# Patient Record
Sex: MALE | Race: BLACK OR AFRICAN AMERICAN | Employment: FULL TIME | URBAN - METROPOLITAN AREA
[De-identification: names, ages, dates, MRNs, and addresses within clinical notes are randomized per-mention and may not be internally consistent; named-entity substitution may affect disease eponyms.]

---

## 2017-02-10 ENCOUNTER — HOSPITAL ENCOUNTER (EMERGENCY)
Age: 32
Discharge: HOME | End: 2017-02-10
Attending: EMERGENCY MEDICINE | Admitting: EMERGENCY MEDICINE
Payer: SELF-PAY

## 2017-02-10 VITALS
BODY MASS INDEX: 22.5 KG/M2 | SYSTOLIC BLOOD PRESSURE: 140 MMHG | HEART RATE: 80 BPM | HEIGHT: 66 IN | OXYGEN SATURATION: 100 % | RESPIRATION RATE: 16 BRPM | DIASTOLIC BLOOD PRESSURE: 92 MMHG | WEIGHT: 140 LBS | TEMPERATURE: 99.4 F

## 2017-02-10 DIAGNOSIS — K02.9 PAIN DUE TO DENTAL CARIES: Primary | ICD-10-CM

## 2017-02-10 DIAGNOSIS — K05.10 GINGIVITIS: ICD-10-CM

## 2017-02-10 PROCEDURE — 99283 EMERGENCY DEPT VISIT LOW MDM: CPT

## 2017-02-10 PROCEDURE — 74011250637 HC RX REV CODE- 250/637: Performed by: EMERGENCY MEDICINE

## 2017-02-10 RX ORDER — OXYCODONE AND ACETAMINOPHEN 5; 325 MG/1; MG/1
1 TABLET ORAL
Qty: 20 TAB | Refills: 0 | Status: SHIPPED | OUTPATIENT
Start: 2017-02-10 | End: 2017-02-17

## 2017-02-10 RX ORDER — OXYCODONE AND ACETAMINOPHEN 5; 325 MG/1; MG/1
1 TABLET ORAL
Status: COMPLETED | OUTPATIENT
Start: 2017-02-10 | End: 2017-02-10

## 2017-02-10 RX ORDER — CHLORHEXIDINE GLUCONATE 1.2 MG/ML
15 RINSE ORAL 2 TIMES DAILY
Qty: 420 ML | Refills: 0 | Status: SHIPPED | OUTPATIENT
Start: 2017-02-10 | End: 2017-02-24

## 2017-02-10 RX ORDER — PENICILLIN V POTASSIUM 500 MG/1
500 TABLET, FILM COATED ORAL 4 TIMES DAILY
Qty: 40 TAB | Refills: 0 | Status: SHIPPED | OUTPATIENT
Start: 2017-02-10 | End: 2017-02-20

## 2017-02-10 RX ADMIN — OXYCODONE HYDROCHLORIDE AND ACETAMINOPHEN 1 TABLET: 5; 325 TABLET ORAL at 14:58

## 2017-02-10 NOTE — ED NOTES
I have reviewed discharge instructions with the patient. The patient verbalized understanding. Patient armband removed and given to patient to take home. Patient was informed of the privacy risks if armband lost or stolen. Pt alert, oriented x4 and ambulatory out of ER in [facility] at this time. VSS.

## 2017-02-10 NOTE — ED PROVIDER NOTES
HPI Comments: 2:52 PM Danyelle Disla is a 36 y.o. male who presents to the ED c/o L upper gum pain. Pt states that the pain started 2 days ago and he has associated swelling. Pt denies temperature sensitivity. Pt has no other sx or complaints. History reviewed. No pertinent past medical history. History reviewed. No pertinent past surgical history. History reviewed. No pertinent family history. Social History     Social History    Marital status: SINGLE     Spouse name: N/A    Number of children: N/A    Years of education: N/A     Occupational History    Not on file. Social History Main Topics    Smoking status: Never Smoker    Smokeless tobacco: Not on file    Alcohol use No    Drug use: No    Sexual activity: Not on file     Other Topics Concern    Not on file     Social History Narrative    No narrative on file         ALLERGIES: Review of patient's allergies indicates no known allergies. Review of Systems   Constitutional: Negative for chills and fever. HENT: Positive for dental problem. Negative for sore throat. Respiratory: Negative for shortness of breath. Cardiovascular: Negative for chest pain. Gastrointestinal: Negative for diarrhea and vomiting. Skin: Negative for rash. Neurological: Negative for headaches. All other systems reviewed and are negative. Vitals:    02/10/17 1325   BP: (!) 140/92   Pulse: 80   Resp: 16   Temp: 99.4 °F (37.4 °C)   SpO2: 100%   Weight: 63.5 kg (140 lb)   Height: 5' 6\" (1.676 m)            Physical Exam   Constitutional: He is oriented to person, place, and time. He appears well-developed and well-nourished. He appears distressed. 36year old  male in moderate painful distress. HENT:   Head: Normocephalic and atraumatic. Right Ear: External ear normal.   Left Ear: External ear normal.   Nose: Nose normal.   Mouth/Throat: Oropharynx is clear and moist. No oropharyngeal exudate.    Severe dental decay tooth #9 and tooth #10. Moderate gingival swelling and erythema noted, no bloating   Eyes: Conjunctivae and EOM are normal. Pupils are equal, round, and reactive to light. Right eye exhibits no discharge. Left eye exhibits no discharge. No scleral icterus. Neck: Normal range of motion. Neck supple. No JVD present. No tracheal deviation present. No thyromegaly present. Cardiovascular: Normal rate, regular rhythm, normal heart sounds and intact distal pulses. Exam reveals no gallop and no friction rub. No murmur heard. Pulmonary/Chest: Effort normal and breath sounds normal. No stridor. No respiratory distress. He has no wheezes. He has no rales. He exhibits no tenderness. Abdominal: Soft. Bowel sounds are normal. He exhibits no distension and no mass. There is no tenderness. There is no rebound and no guarding. Musculoskeletal: Normal range of motion. He exhibits no edema, tenderness or deformity. Lymphadenopathy:     He has no cervical adenopathy. Neurological: He is alert and oriented to person, place, and time. No cranial nerve deficit. He exhibits normal muscle tone. Coordination normal.   Skin: Skin is warm and dry. No rash noted. He is not diaphoretic. No erythema. No pallor. Psychiatric: He has a normal mood and affect. His behavior is normal. Judgment and thought content normal.   Nursing note and vitals reviewed. MDM  Number of Diagnoses or Management Options  Gingivitis:   Pain due to dental caries:   Diagnosis management comments: Differential includes:  Gingivitis, Dental abscess, Dental caries.        Amount and/or Complexity of Data Reviewed  Review and summarize past medical records: yes    Risk of Complications, Morbidity, and/or Mortality  Presenting problems: low  Diagnostic procedures: minimal  Management options: low    Patient Progress  Patient progress: stable    ED Course       Procedures        Vitals:  Patient Vitals for the past 12 hrs:   Temp Pulse Resp BP SpO2   02/10/17 1325 99.4 °F (37.4 °C) 80 16 (!) 140/92 100 %       Medications ordered:   Medications   oxyCODONE-acetaminophen (PERCOCET) 5-325 mg per tablet 1 Tab (not administered)         Lab findings:  No results found for this or any previous visit (from the past 12 hour(s)). Reevaluation of patient:   2:53 PM I have reassessed the patient and discussed their results and diagnosis. Pt will be discharged in stable condition. Patient is to return to emergency department if any new or worsening condition. Patient understands and verbalizes agreement with plan. Disposition:  Diagnosis:   1. Pain due to dental caries    2. Gingivitis        Disposition: Discharge     Follow-up Information     Follow up With Details Comments [address] [address] [phone]           Patient's Medications   Start Taking    CHLORHEXIDINE (PERIDEX) 0.12 % SOLUTION    15 mL by Swish and Spit route two (2) times a day for 14 days. OXYCODONE-ACETAMINOPHEN (PERCOCET) 5-325 MG PER TABLET    Take 1 Tab by mouth every six (6) hours as needed for Pain (1 to 2 tablets) for up to 7 days. Max Daily Amount: 4 Tabs. PENICILLIN V POTASSIUM (VEETID) 500 MG TABLET    Take 1 Tab by mouth four (4) times daily for 10 days. Continue Taking    No medications on file   These Medications have changed    No medications on file   Stop Taking    No medications on file       Scribe Attestation:   I, Nanci Luke, am scribing for and in the presence of Keo Corrigan DO on this day 02/10/17 at 2:53 PM   cathy Verma    Provider Attestation:  I personally performed the services described in the documentation, reviewed the documentation, as recorded by the scribe in my presence, and it accurately and completely records my words and actions.   Keo Corrigan DO. 2:53 PM      Signed by: Cathy Verma, 2:53 PM

## 2017-02-10 NOTE — DISCHARGE INSTRUCTIONS
Learning About Dental Care for Your Child  What is good dental care for your child? It's never too early to start cleaning your child's gums and teeth. Bacteria, like those found in plaque, can lead to dental problems. Plaque is a thin film of bacteria that sticks to teeth above and below the gum line. The bacteria in plaque use sugars in food to make acids. These acids can cause tooth decay and gum disease. Good brushing habits can help to remove bacteria and prevent plaque. And regular teeth cleaning by your child's dentist can remove tartar, which is plaque that has built up and hardened. As part of your child's dental health, give your child healthy foods, including whole grains, vegetables, and fruits. Try to avoid foods that are high in sugar and processed carbohydrates, such as pastries, pasta, and white bread. Healthy eating helps to keep gums healthy and make teeth strong. It also helps your child avoid tooth decay, which can lead to holes (cavities) in the teeth. How can you manage your child's dental care? Birth to 3 years  · Make sure that your family practices good dental habits. Keeping your own teeth and gums healthy lowers the risk of passing bacteria from your mouth to your child. Also, avoid sharing spoons and other utensils with your child. · Don't put your baby to bed with a bottle of juice, milk, formula, or other sugary liquid. This raises the chance of tooth decay. · Use a soft cloth to clean your baby's gums. Start a few days after birth, and do this until the first teeth come in. As soon as the teeth come in, clean them with a soft toothbrush. Ask your dentist if it's okay to use a rice-sized amount of fluoride toothpaste. · Experts recommend that your child have a dental exam by his or her first birthday or 6 months after the first teeth appear, whichever comes first.  Ages 3 to 10 years  · Your child can learn how to brush his or her own teeth at about 1years of age. Children should be brushing their own teeth, morning and night, by age 3. You should still supervise and check for proper cleaning. · Give your child a small, soft toothbrush. Use a pea-sized amount of fluoride toothpaste. Encourage your child to watch you and older siblings brush teeth. Teach your child not to swallow the toothpaste. · Talk with your dentist about when and how to floss your child's teeth and to teach your child to floss. · Help children age 3 years and older to stop sucking their fingers, thumbs, or pacifiers. If your child can't stop, see your dentist. [facility] children[facility] dentist is specially trained to treat this problem. Ages 10 to 16 years  · A child's teeth should be flossed as soon as the teeth touch each other. Flossing can be hard for a child to learn. Talk with your dentist about the right way to teach your child how to floss. · Your dentist may advise the use of a mouthwash that contains fluoride. But teach your child not to swallow it. · Use disclosing tablets from time to time. They can help you see if any plaque is left on your child's teeth after brushing. These tablets are chewable and will color any plaque left on the teeth after the child brushes. You can buy these at most drugstores. · After your child's permanent teeth begin to appear, talk with your dentist about having dental sealant placed on the molars. Follow-up care is a key part of your child's treatment and safety. Be sure to make and go to all appointments, and call your dentist if your child is having problems. It's also a good idea to know your test results and keep a list of the medicines your child takes. Where can you learn more? Go to http://magdi-zaria.info/. Enter A467 in the search box to learn more about \"Learning About Dental Care for Your Child. \"  Current as of: August 9, 2016  Content Version: 11.1  © 9616-0783 [employer].  Care instructions adapted under license by [employer] (which disclaims liability or warranty for this information). If you have questions about a medical condition or this instruction, always ask your healthcare professional. [address] any warranty or liability for your use of this information. Tooth Decay: Care Instructions  Your Care Instructions    Tooth decay is damage to a tooth caused by plaque. Plaque is a thin film of bacteria that sticks to the teeth above and below the gum line. If plaque isn't removed from the teeth, it can build up and harden into tartar. The bacteria in plaque and tartar use sugars in food to make acids. These acids can cause tooth decay and gum disease. Any part of your tooth can decay, from the roots below the gum line to the chewing surface. Decay can affect the outer layer (enamel) or inner layer (dentin) of your teeth. The deeper the decay, the worse the damage. Untreated tooth decay will get worse and may lead to tooth loss. If you have a small hole (cavity) in your tooth, your dentist can repair it by removing the decay and filling the hole. If you have deeper decay, you may need more treatment. A very badly damaged tooth may have to be removed. Follow-up care is a key part of your treatment and safety. Be sure to make and go to all appointments, and call your dentist if you are having problems. It's also a good idea to know your test results and keep a list of the medicines you take. How can you care for yourself at home? If you have pain:  · Take an over-the-counter pain medicine, such as acetaminophen (Tylenol), ibuprofen (Advil, Motrin), or naproxen (Aleve). Be safe with medicines. Read and follow all instructions on the label. ¨ Do not take two or more pain medicines at the same time unless the doctor told you to. Many pain medicines have acetaminophen, which is Tylenol. Too much acetaminophen (Tylenol) can be harmful.   · Put ice or a cold pack on your cheek over the tooth for 10 to 15 minutes at a time. Put a thin cloth between the ice and your skin. To prevent tooth decay  · Brush teeth twice a day, and floss once a day. Brushing with fluoride toothpaste and flossing may be enough to reverse early decay. · Use a toothbrush with soft, rounded-end bristles and a head that is small enough to reach all parts of your teeth and mouth. Replace your toothbrush every 3 or 4 months. You may also use an electric toothbrush that has rotating and oscillating (back-and-forth) action. · Ask your dentist about having fluoride treatments at the dental office. · Brush your tongue to help get rid of bacteria. · Eat healthy foods that include whole grains, vegetables, and fruits. · Have your teeth cleaned by a professional at least two times a year. · Do not smoke or use smokeless tobacco. Tobacco can make tooth decay worse. When should you call for help? Call your dentist now or seek immediate medical care if:  · You have signs of infection, such as:  ¨ Increased pain, swelling, warmth, or redness. ¨ Red streaks on the gum leading from a tooth. ¨ Pus draining from the gum around a tooth. ¨ A fever. · You have a toothache. Watch closely for changes in your health, and be sure to contact your dentist if you have any problems. Where can you learn more? Go to http://magdi-zaria.info/. Enter I003 in the search box to learn more about \"Tooth Decay: Care Instructions. \"  Current as of: August 9, 2016  Content Version: 11.1  © 4101-6013 [employer]. Care instructions adapted under license by [employer] (which disclaims liability or warranty for this information). If you have questions about a medical condition or this instruction, always ask your healthcare professional. [place] [address] any warranty or liability for your use of this information. Learning About Dental Sealants  What is a dental sealant?     A dental sealant is a strong liquid-plastic material that helps protect teeth from plaque. Plaque is a thin film of bacteria that sticks to teeth. The bacteria in plaque use sugars in food to make acids. These acids can damage the tooth's surface and cause tooth decay. The sealant is put on the chewing surfaces of the back teeth (molars). These teeth are more likely to develop tooth decay because food and bacteria easily get stuck in the pits and grooves of the surface. Some pits and grooves are so small that a toothbrush can't clean them out. Sealants bond to the tooth's enamel. Enamel is the hard surface of the tooth. It covers the dentin, which protects and surrounds the tooth pulp. The pulp is the core of the tooth, the place where nerves and blood vessels are. A dental sealant does not take the place of good dental care and use of fluoride toothpaste and mouthwash. It's still important to brush and floss daily. Sealants can be used in children, starting at about age 10, and in teens and adults. How is it applied? To apply a sealant, the dentist will place a cotton roll around the teeth to soak up saliva and keep the teeth dry. A sheet of rubber called a rubber dam may be used to isolate the teeth from the rest of the mouth. After cleaning the teeth, the dentist applies the liquid that will glue the sealant to the teeth. After about a minute, he or she will rinse off the excess glue, dry the teeth, and put on the sealant. One type of sealant hardens quickly with the use of a bright light. Others harden more slowly. Sealants can also be applied by a dental assistant or dental hygienist.  How long does a dental sealant last?  Dental sealants may wear down over time, but they can protect teeth from decay for years. Your dentist can check them and reapply them if needed. Follow-up care is a key part of your treatment and safety. Be sure to make and go to all appointments, and call your dentist if you are having problems.  It's also a good idea to know your test results and keep a list of the medicines you take. Where can you learn more? Go to http://magdi-zaria.info/. Enter C103 in the search box to learn more about \"Learning About Dental Sealants. \"  Current as of: August 9, 2016  Content Version: 11.1  © 7500-9745 [employer]. Care instructions adapted under license by [employer] (which disclaims liability or warranty for this information). If you have questions about a medical condition or this instruction, always ask your healthcare professional. [place] [address] any warranty or liability for your use of this information.

## 2018-11-01 ENCOUNTER — HOSPITAL ENCOUNTER (EMERGENCY)
Age: 33
Discharge: HOME | End: 2018-11-01
Attending: EMERGENCY MEDICINE | Admitting: EMERGENCY MEDICINE
Payer: COMMERCIAL

## 2018-11-01 ENCOUNTER — APPOINTMENT (OUTPATIENT)
Dept: VASCULAR SURGERY | Age: 33
End: 2018-11-01
Attending: PHYSICIAN ASSISTANT
Payer: COMMERCIAL

## 2018-11-01 ENCOUNTER — APPOINTMENT (OUTPATIENT)
Dept: GENERAL RADIOLOGY | Age: 33
End: 2018-11-01
Attending: PHYSICIAN ASSISTANT
Payer: COMMERCIAL

## 2018-11-01 VITALS
DIASTOLIC BLOOD PRESSURE: 87 MMHG | RESPIRATION RATE: 16 BRPM | SYSTOLIC BLOOD PRESSURE: 121 MMHG | TEMPERATURE: 98.5 F | BODY MASS INDEX: 22.6 KG/M2 | WEIGHT: 140 LBS | OXYGEN SATURATION: 98 % | HEART RATE: 86 BPM

## 2018-11-01 DIAGNOSIS — M79.662 PAIN OF LEFT KNEE AND LOWER LEG: Primary | ICD-10-CM

## 2018-11-01 DIAGNOSIS — M17.12 ARTHRITIS OF LEFT KNEE: ICD-10-CM

## 2018-11-01 DIAGNOSIS — M25.562 PAIN OF LEFT KNEE AND LOWER LEG: Primary | ICD-10-CM

## 2018-11-01 PROCEDURE — 99282 EMERGENCY DEPT VISIT SF MDM: CPT

## 2018-11-01 PROCEDURE — 93971 EXTREMITY STUDY: CPT

## 2018-11-01 PROCEDURE — 73560 X-RAY EXAM OF KNEE 1 OR 2: CPT

## 2018-11-01 RX ORDER — LIDOCAINE 50 MG/G
PATCH TOPICAL
Qty: 1 PACKAGE | Refills: 0 | Status: SHIPPED | OUTPATIENT
Start: 2018-11-01

## 2018-11-01 RX ORDER — NAPROXEN 250 MG/1
TABLET ORAL 2 TIMES DAILY WITH MEALS
COMMUNITY

## 2018-11-01 RX ORDER — IBUPROFEN 800 MG/1
800 TABLET ORAL
Qty: 20 TAB | Refills: 0 | Status: SHIPPED | OUTPATIENT
Start: 2018-11-01 | End: 2018-11-08

## 2018-11-01 NOTE — ED PROVIDER NOTES
EMERGENCY DEPARTMENT HISTORY AND PHYSICAL EXAM 
 
4:37 PM 
 
 
Date: 11/1/2018 Patient Name: Markell Ewing History of Presenting Illness Chief Complaint Patient presents with  Leg Pain History Provided By: Patient Chief Complaint: leg pain Duration: 1 Days Timing:  Acute Location:  Left lower leg Quality: Aching Severity: 8 out of 10 Modifying Factors: naprosyn helped Associated Symptoms: denies any other associated signs or symptoms Additional History (Context): Markell Ewing is a 34 y.o. male with No significant past medical history who presents with left leg pain. Started this morning. No injury or trauma. He walks a lot at work. He has pain starting in the knee and into the calf. He denies smoking, hormones, recent travel, immobilization, recent surgery, history of blood clots, malignancy, hemoptysis, leg swelling. Celio Singh PCP: Unknown, Provider Current Outpatient Medications Medication Sig Dispense Refill  naproxen (NAPROSYN) 250 mg tablet Take  by mouth two (2) times daily (with meals).  ibuprofen (MOTRIN) 800 mg tablet Take 1 Tab by mouth every six (6) hours as needed for Pain for up to 7 days. 20 Tab 0  
 lidocaine (LIDODERM) 5 % Apply patch to the affected area for 12 hours a day and remove for 12 hours a day. 1 Package 0 Past History Past Medical History: 
History reviewed. No pertinent past medical history. Past Surgical History: 
History reviewed. No pertinent surgical history. Family History: 
History reviewed. No pertinent family history. Social History: 
Social History Tobacco Use  Smoking status: Never Smoker  Smokeless tobacco: Never Used Substance Use Topics  Alcohol use: No  
 Drug use: No  
 
 
Allergies: 
No Known Allergies Review of Systems Review of Systems Constitutional: Negative for fever. HENT: Negative for facial swelling. Eyes: Negative for visual disturbance. Respiratory: Negative for shortness of breath. Cardiovascular: Negative for chest pain. Gastrointestinal: Negative for abdominal pain. Genitourinary: Negative for dysuria. Musculoskeletal: Positive for arthralgias and myalgias. Negative for neck pain. Skin: Negative for rash. Neurological: Negative for dizziness. Psychiatric/Behavioral: Negative for confusion. All other systems reviewed and are negative. Physical Exam  
 
Visit Vitals /87 (BP 1 Location: Right arm, BP Patient Position: At rest) Pulse 86 Temp 98.5 °F (36.9 °C) Resp 16 Wt 63.5 kg (140 lb) SpO2 98% BMI 22.60 kg/m² Physical Exam  
Constitutional: He appears well-developed and well-nourished. No distress. HENT:  
Head: Normocephalic and atraumatic. Eyes: Conjunctivae are normal.  
Neck: Normal range of motion. Neck supple. Cardiovascular: Normal rate. Pulmonary/Chest: Effort normal.  
Abdominal: Soft. Musculoskeletal: Normal range of motion. Tenderness to entire calf. Mild tenderness to knee, diffuse. Full ROm in knee without pain. No pain or laxity elicited on posterior drawer, anterior drawer, valgus or varus testing. Neurological: He is alert. Skin: Skin is warm and dry. He is not diaphoretic. No swelling or erythema Psychiatric: He has a normal mood and affect. Nursing note and vitals reviewed. Diagnostic Study Results Labs - No results found for this or any previous visit (from the past 12 hour(s)). Radiologic Studies -  
XR KNEE LT MAX 2 VWS    (Results Pending) Medical Decision Making I am the first provider for this patient. I reviewed the vital signs, available nursing notes, past medical history, past surgical history, family history and social history. Vital Signs-Reviewed the patient's vital signs. Records Reviewed: Nursing Notes (Time of Review: 4:37 PM) ED Course: Progress Notes, Reevaluation, and Consults: 
6:17 PM 
 XR negative. Duplex negative. No specific location of pain. Negative for laxity. Do not suspect any acute pathology. Provider Notes (Medical Decision Making): MDM Number of Diagnoses or Management Options Arthritis of left knee:  
Pain of left knee and lower leg:  
 
  
 
Diagnosis Clinical Impression: 1. Pain of left knee and lower leg 2. Arthritis of left knee Disposition:  
 
Follow-up Information Follow up With Specialties Details Why Contact Info Claritza Gill MD Orthopedic Surgery  If symptoms do not improve [facility] [address] [address] [place] [address] 
[phone] [facility] EMERGENCY DEPT Emergency Medicine  Immediately if symptoms worsen [address] 
[phone] Medication List  
  
START taking these medications   
ibuprofen 800 mg tablet Commonly known as:  MOTRIN Take 1 Tab by mouth every six (6) hours as needed for Pain for up to 7 days. lidocaine 5 % Commonly known as:  Patrica Linder Apply patch to the affected area for 12 hours a day and remove for 12 hours a day. ASK your doctor about these medications   
naproxen 250 mg tablet Commonly known as:  NAPROSYN Where to Get Your Medications Information about where to get these medications is not yet available Ask your nurse or doctor about these medications · ibuprofen 800 mg tablet · lidocaine 5 % 
  
 
_______________________________ Attestations: 
Scribe Attestation Reece PEREZ Yañez PA-C acting as a scribe for and in the presence of [employer] November 01, 2018 at [facility] PM 
    
Provider Attestation:     
I personally performed the services described in the documentation, reviewed the documentation, as recorded by the scribe in my presence, and it accurately and completely records my words and actions. November 01, 2018 at 6:20 PM - TAYLOR Verdugo 
_______________________________

## 2018-11-01 NOTE — DISCHARGE INSTRUCTIONS
Arthritis: Care Instructions  Your Care Instructions  Arthritis, also called osteoarthritis, is a breakdown of the cartilage that cushions your joints. When the cartilage wears down, your bones rub against each other. This causes pain and stiffness. Many people have some arthritis as they age. Arthritis most often affects the joints of the spine, hands, hips, knees, or feet. You can take simple measures to protect your joints, ease your pain, and help you stay active. Follow-up care is a key part of your treatment and safety. Be sure to make and go to all appointments, and call your doctor if you are having problems. It's also a good idea to know your test results and keep a list of the medicines you take. How can you care for yourself at home? · Stay at a healthy weight. Being overweight puts extra strain on your joints. · Talk to your doctor or physical therapist about exercises that will help ease joint pain. ? Stretch. You may enjoy gentle forms of yoga to help keep your joints and muscles flexible. ? Walk instead of jog. Other types of exercise that are less stressful on the joints include riding a bicycle, swimming, raisa chi, or water exercise. ? Lift weights. Strong muscles help reduce stress on your joints. Stronger thigh muscles, for example, take some of the stress off of the knees and hips. Learn the right way to lift weights so you do not make joint pain worse. · Take your medicines exactly as prescribed. Call your doctor if you think you are having a problem with your medicine. · Take pain medicines exactly as directed. ? If the doctor gave you a prescription medicine for pain, take it as prescribed. ? If you are not taking a prescription pain medicine, ask your doctor if you can take an over-the-counter medicine. · Use a cane, crutch, walker, or another device if you need help to get around. These can help rest your joints.  You also can use other things to make life easier, such as a higher toilet seat and padded handles on kitchen utensils. · Do not sit in low chairs, which can make it hard to get up. · Put heat or cold on your sore joints as needed. Use whichever helps you most. You also can take turns with hot and cold packs. ? Apply heat 2 or 3 times a day for 20 to 30 minutes--using a heating pad, hot shower, or hot pack--to relieve pain and stiffness. ? Put ice or a cold pack on your sore joint for 10 to 20 minutes at a time. Put a thin cloth between the ice and your skin. When should you call for help? Call your doctor now or seek immediate medical care if:    · You have sudden swelling, warmth, or pain in any joint.     · You have joint pain and a fever or rash.     · You have such bad pain that you cannot use a joint.    Watch closely for changes in your health, and be sure to contact your doctor if:    · You have mild joint symptoms that continue even with more than 6 weeks of care at home.     · You have stomach pain or other problems with your medicine. Where can you learn more? Go to http://[employer].info/. Enter H820 in the search box to learn more about \"Arthritis: Care Instructions. \"  Current as of: June 11, 2018  Content Version: 11.8  © 7533-4163 [employer]. Care instructions adapted under license by [employer] (which disclaims liability or warranty for this information). If you have questions about a medical condition or this instruction, always ask your healthcare professional. [place] [address] any warranty or liability for your use of this information. Knee Arthritis: Care Instructions  Your Care Instructions    Knee arthritis is a breakdown of the cartilage that cushions your knee joint. When the cartilage wears down, your bones rub against each other. This causes pain and stiffness. Knee arthritis tends to get worse with time.   Treatment for knee arthritis involves reducing pain, making the leg muscles stronger, and staying at a healthy body weight. The treatment usually does not improve the health of the cartilage, but it can reduce pain and improve how well your knee works. You can take simple measures to protect your knee joints, ease your pain, and help you stay active. Follow-up care is a key part of your treatment and safety. Be sure to make and go to all appointments, and call your doctor if you are having problems. It's also a good idea to know your test results and keep a list of the medicines you take. How can you care for yourself at home? · Know that knee arthritis will cause more pain on some days than on others. · Stay at a healthy weight. Lose weight if you are overweight. When you stand up, the pressure on your knees from every pound of body weight is multiplied four times. So if you lose 10 pounds, you will reduce the pressure on your knees by 40 pounds. · Talk to your doctor or physical therapist about exercises that will help ease joint pain. ? Stretch to help prevent stiffness and to prevent injury before you exercise. You may enjoy gentle forms of yoga to help keep your knee joints and muscles flexible. ? Walk instead of jog.  ? Ride a bike. This makes your thigh muscles stronger and takes pressure off your knee. ? Wear well-fitting and comfortable shoes. ? Exercise in chest-deep water. This can help you exercise longer with less pain. ? Avoid exercises that include squatting or kneeling. They can put a lot of strain on your knees. ? Talk to your doctor to make sure that the exercise you do is not making the arthritis worse. · Do not sit for long periods of time. Try to walk once in a while to keep your knee from getting stiff. · Ask your doctor or physical therapist whether shoe inserts may reduce your arthritis pain. · If you can afford it, get new athletic shoes at least every year. This can help reduce the strain on your knees.   · Use a device to help you do everyday activities. ? A cane or walking stick can help you keep your balance when you walk. Hold the cane or walking stick in the hand opposite the painful knee. ? If you feel like you may fall when you walk, try using crutches or a front-wheeled walker. These can prevent falls that could cause more damage to your knee. ? A knee brace may help keep your knee stable and prevent pain. ? You also can use other things to make life easier, such as a higher toilet seat and handrails in the bathtub or shower. · Take pain medicines exactly as directed. ? Do not wait until you are in severe pain. You will get better results if you take it sooner. ? If you are not taking a prescription pain medicine, take an over-the-counter medicine such as acetaminophen (Tylenol), ibuprofen (Advil, Motrin), or naproxen (Aleve). Read and follow all instructions on the label. ? Do not take two or more pain medicines at the same time unless the doctor told you to. Many pain medicines have acetaminophen, which is Tylenol. Too much acetaminophen (Tylenol) can be harmful. ? Tell your doctor if you take a blood thinner, have diabetes, or have allergies to shellfish. · Ask your doctor if you might benefit from a shot of steroid medicine into your knee. This may provide pain relief for several months. · Many people take the supplements glucosamine and chondroitin for osteoarthritis. Some people feel they help, but the medical research does not show that they work. Talk to your doctor before you take these supplements. When should you call for help? Call your doctor now or seek immediate medical care if:    · You have sudden swelling, warmth, or pain in your knee.     · You have knee pain and a fever or rash.     · You have such bad pain that you cannot use your knee.    Watch closely for changes in your health, and be sure to contact your doctor if you have any problems. Where can you learn more?   Go to http://magdi-zaria.info/. Enter Q152 in the search box to learn more about \"Knee Arthritis: Care Instructions. \"  Current as of: June 11, 2018  Content Version: 11.8  © 8356-4722 [employer], [employer]. Care instructions adapted under license by [employer] (which disclaims liability or warranty for this information). If you have questions about a medical condition or this instruction, always ask your healthcare professional. [place] [address] any warranty or liability for your use of this information.

## 2021-07-25 ENCOUNTER — HOSPITAL ENCOUNTER (EMERGENCY)
Age: 36
Discharge: HOME | End: 2021-07-25
Attending: EMERGENCY MEDICINE
Payer: COMMERCIAL

## 2021-07-25 VITALS
TEMPERATURE: 98.5 F | RESPIRATION RATE: 18 BRPM | SYSTOLIC BLOOD PRESSURE: 129 MMHG | DIASTOLIC BLOOD PRESSURE: 95 MMHG | HEIGHT: 68 IN | HEART RATE: 86 BPM | WEIGHT: 145 LBS | OXYGEN SATURATION: 100 % | BODY MASS INDEX: 21.98 KG/M2

## 2021-07-25 DIAGNOSIS — H72.91 RUPTURED EAR DRUM, RIGHT: Primary | ICD-10-CM

## 2021-07-25 DIAGNOSIS — H10.213 CHEMICAL CONJUNCTIVITIS OF BOTH EYES: ICD-10-CM

## 2021-07-25 DIAGNOSIS — T65.893A TOXIC EFFECT OF PEPPER SPRAY, ASSAULT, INITIAL ENCOUNTER: ICD-10-CM

## 2021-07-25 DIAGNOSIS — S01.311A LACERATION OF RIGHT EAR LOBE, INITIAL ENCOUNTER: ICD-10-CM

## 2021-07-25 PROCEDURE — 74011250637 HC RX REV CODE- 250/637: Performed by: EMERGENCY MEDICINE

## 2021-07-25 PROCEDURE — 99282 EMERGENCY DEPT VISIT SF MDM: CPT

## 2021-07-25 RX ORDER — HYDROCODONE BITARTRATE AND ACETAMINOPHEN 5; 325 MG/1; MG/1
1 TABLET ORAL
Qty: 12 TABLET | Refills: 0 | Status: SHIPPED | OUTPATIENT
Start: 2021-07-25 | End: 2021-07-28

## 2021-07-25 RX ORDER — HYDROCODONE BITARTRATE AND ACETAMINOPHEN 5; 325 MG/1; MG/1
1 TABLET ORAL
Status: COMPLETED | OUTPATIENT
Start: 2021-07-25 | End: 2021-07-25

## 2021-07-25 RX ORDER — CIPROFLOXACIN AND DEXAMETHASONE 3; 1 MG/ML; MG/ML
4 SUSPENSION/ DROPS AURICULAR (OTIC) 2 TIMES DAILY
Qty: 2 ML | Refills: 0 | Status: SHIPPED | OUTPATIENT
Start: 2021-07-25

## 2021-07-25 RX ORDER — AMOXICILLIN 500 MG/1
500 TABLET, FILM COATED ORAL 3 TIMES DAILY
Qty: 21 TABLET | Refills: 0 | Status: SHIPPED | OUTPATIENT
Start: 2021-07-25

## 2021-07-25 RX ORDER — ERYTHROMYCIN 5 MG/G
OINTMENT OPHTHALMIC
Qty: 1 TUBE | Refills: 0 | Status: SHIPPED | OUTPATIENT
Start: 2021-07-25 | End: 2021-08-01

## 2021-07-25 RX ADMIN — HYDROCODONE BITARTRATE AND ACETAMINOPHEN 1 TABLET: 5; 325 TABLET ORAL at 03:36
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
